# Patient Record
Sex: MALE | Race: WHITE
[De-identification: names, ages, dates, MRNs, and addresses within clinical notes are randomized per-mention and may not be internally consistent; named-entity substitution may affect disease eponyms.]

---

## 2022-01-27 ENCOUNTER — HOSPITAL ENCOUNTER (OUTPATIENT)
Dept: HOSPITAL 79 - EXRD | Age: 57
End: 2022-01-27
Attending: INTERNAL MEDICINE
Payer: COMMERCIAL

## 2022-01-27 DIAGNOSIS — I25.10: ICD-10-CM

## 2022-01-27 DIAGNOSIS — Z13.21: ICD-10-CM

## 2022-01-27 DIAGNOSIS — I10: Primary | ICD-10-CM

## 2022-01-27 DIAGNOSIS — E78.1: ICD-10-CM

## 2022-01-27 DIAGNOSIS — R06.2: ICD-10-CM

## 2022-01-27 DIAGNOSIS — Z00.00: ICD-10-CM

## 2022-01-27 DIAGNOSIS — E78.5: ICD-10-CM

## 2022-01-27 DIAGNOSIS — M10.9: ICD-10-CM

## 2022-03-02 ENCOUNTER — HOSPITAL ENCOUNTER (OUTPATIENT)
Dept: HOSPITAL 79 - HEART 5 | Age: 57
End: 2022-03-02
Attending: STUDENT IN AN ORGANIZED HEALTH CARE EDUCATION/TRAINING PROGRAM
Payer: COMMERCIAL

## 2022-03-02 DIAGNOSIS — I25.10: Primary | ICD-10-CM

## 2022-03-02 DIAGNOSIS — R06.2: ICD-10-CM

## 2022-03-02 DIAGNOSIS — R06.03: ICD-10-CM

## 2022-04-13 ENCOUNTER — HOSPITAL ENCOUNTER (OUTPATIENT)
Dept: HOSPITAL 79 - LAB | Age: 57
End: 2022-04-13
Attending: STUDENT IN AN ORGANIZED HEALTH CARE EDUCATION/TRAINING PROGRAM
Payer: COMMERCIAL

## 2022-04-13 DIAGNOSIS — E08.311: ICD-10-CM

## 2022-04-13 DIAGNOSIS — I10: Primary | ICD-10-CM

## 2022-04-13 DIAGNOSIS — E78.1: ICD-10-CM

## 2022-04-13 LAB — BUN/CREATININE RATIO: 13 (ref 0–10)

## 2022-05-26 ENCOUNTER — TELEPHONE (OUTPATIENT)
Dept: UROLOGY | Facility: CLINIC | Age: 57
End: 2022-05-26

## 2022-05-26 NOTE — TELEPHONE ENCOUNTER
Caller: SAULO FUNES    Relationship to patient: SELF    Best call back number: [phone]    Chief complaint: Hypogonadism    Type of visit: NEW PATIENT    If rescheduling, when is the original appointment: 5/26/22     Additional notes:PT STATES FLAT TIRE AND RAINING SO HE IS HAVING A HARD TIME GETTING IT FIXED. RESCHEDULED TO NEXT AVAILABLE 6/13/22.

## 2022-06-13 ENCOUNTER — OFFICE VISIT (OUTPATIENT)
Dept: UROLOGY | Facility: CLINIC | Age: 57
End: 2022-06-13

## 2022-06-13 VITALS — HEIGHT: 74 IN | WEIGHT: 273 LBS | BODY MASS INDEX: 35.04 KG/M2

## 2022-06-13 DIAGNOSIS — R79.89 LOW TESTOSTERONE IN MALE: ICD-10-CM

## 2022-06-13 DIAGNOSIS — N42.9 DISORDER OF PROSTATE: Primary | ICD-10-CM

## 2022-06-13 PROCEDURE — 36415 COLL VENOUS BLD VENIPUNCTURE: CPT | Performed by: UROLOGY

## 2022-06-13 PROCEDURE — 99203 OFFICE O/P NEW LOW 30 MIN: CPT | Performed by: UROLOGY

## 2022-06-13 RX ORDER — ERGOCALCIFEROL 1.25 MG/1
50000 CAPSULE ORAL
COMMUNITY
Start: 2022-06-05

## 2022-06-13 RX ORDER — TESTOSTERONE CYPIONATE 200 MG/ML
INJECTION, SOLUTION INTRAMUSCULAR
Qty: 10 ML | Refills: 2 | Status: SHIPPED | OUTPATIENT
Start: 2022-06-13

## 2022-06-13 RX ORDER — ATORVASTATIN CALCIUM 80 MG/1
80 TABLET, FILM COATED ORAL
COMMUNITY
Start: 2022-05-15

## 2022-06-13 RX ORDER — AMLODIPINE BESYLATE 10 MG/1
10 TABLET ORAL DAILY
COMMUNITY
Start: 2022-05-15

## 2022-06-13 RX ORDER — DULAGLUTIDE 0.75 MG/.5ML
0.75 INJECTION, SOLUTION SUBCUTANEOUS
COMMUNITY
Start: 2022-05-25

## 2022-06-13 RX ORDER — CLOPIDOGREL BISULFATE 75 MG/1
75 TABLET ORAL DAILY
COMMUNITY
Start: 2022-04-07

## 2022-06-13 RX ORDER — METOPROLOL SUCCINATE 25 MG/1
25 TABLET, EXTENDED RELEASE ORAL DAILY
COMMUNITY
Start: 2022-04-07

## 2022-06-13 NOTE — PROGRESS NOTES
Chief Complaint:          Chief Complaint   Patient presents with   • Low Testosterone      New patient        HPI:   56 y.o. male referred for evaluation of low testosterone.  Previously seen Dr. Lacy.  Previously used oral testosterone capsules twice a day in the early 2000's.  No family history of prostate cancer.  He has had a vasectomy.  He has erectile dysfunction his A1c was up to 14.  We will initiate therapy and check appropriate labs as examination was entirely on remarkable.  Low Testosterone: This pleasant male patient presents today with signs and symptoms that are consistent with low testosterone. He has positive Asif questionnaire by history, this includes both the sexual and nonsexual side effects.  Sexual side effects include inability to achieve and maintain an erection, inability to maintain his erection, and decreased interest in sexual activity.  Nonsexual symptomatology includes fatigue, difficulty completing a job, and tiredness.  We had a discussion of the various forms of testosterone available including parenteral, topical, and the form of a patch.  We discussed the efficacy of the gels and the injections, as well as the cost and benefits analysis.  We discussed the studies and talked about heart disease and its effect on prostate cancer, both of which are negligible.  He gave verbal consent to proceed with treatment.  He understands the risks and benefits of length.  He also completed his attempts at fertility. He understands the partial effect on spermatogenesis.      Past Medical History:      History reviewed. No pertinent past medical history.      Current Meds:     Current Outpatient Medications   Medication Sig Dispense Refill   • amLODIPine (NORVASC) 10 MG tablet Take 10 mg by mouth Daily. for blood pressure.     • atorvastatin (LIPITOR) 80 MG tablet Take 80 mg by mouth every night at bedtime.     • clopidogrel (PLAVIX) 75 MG tablet Take 75 mg by mouth Daily.     • metFORMIN  (GLUCOPHAGE) 500 MG tablet Take 500 mg by mouth Every 12 (Twelve) Hours. with food     • metoprolol succinate XL (TOPROL-XL) 25 MG 24 hr tablet Take 25 mg by mouth Daily.     • Trulicity 0.75 MG/0.5ML solution pen-injector 0.75 mg.     • vitamin D (ERGOCALCIFEROL) 1.25 MG (24525 UT) capsule capsule Take 50,000 Units by mouth Every 7 (Seven) Days.         No current facility-administered medications for this visit.        Allergies:      No Known Allergies     Past Surgical History:     History reviewed. No pertinent surgical history.      Social History:     Social History     Socioeconomic History   • Marital status:        Family History:     History reviewed. No pertinent family history.    Review of Systems:     Review of Systems   Constitutional: Negative.    HENT: Negative.    Eyes: Negative.    Respiratory: Negative.    Cardiovascular: Negative.    Gastrointestinal: Negative.    Endocrine: Negative.    Musculoskeletal: Negative.    Allergic/Immunologic: Negative.    Neurological: Negative.    Hematological: Negative.    Psychiatric/Behavioral: Negative.        Physical Exam:     Physical Exam  Vitals and nursing note reviewed.   Constitutional:       Appearance: He is well-developed.   HENT:      Head: Normocephalic and atraumatic.   Eyes:      Conjunctiva/sclera: Conjunctivae normal.      Pupils: Pupils are equal, round, and reactive to light.   Cardiovascular:      Rate and Rhythm: Normal rate and regular rhythm.      Heart sounds: Normal heart sounds.   Pulmonary:      Effort: Pulmonary effort is normal.      Breath sounds: Normal breath sounds.   Abdominal:      General: Bowel sounds are normal.      Palpations: Abdomen is soft.   Genitourinary:     Penis: Normal.       Testes: Normal.   Musculoskeletal:         General: Normal range of motion.      Cervical back: Normal range of motion.   Skin:     General: Skin is warm and dry.   Neurological:      Mental Status: He is alert and oriented to  person, place, and time.      Deep Tendon Reflexes: Reflexes are normal and symmetric.   Psychiatric:         Behavior: Behavior normal.         Thought Content: Thought content normal.         Judgment: Judgment normal.         I have reviewed the following portions of the patient's history: Allergies, current medications, past family history, past medical history, past social history, past surgical history, problem list, and ROS and confirm it is accurate.      Procedure:       Assessment/Plan:   Low Testosterone: This pleasant male patient presents today with signs and symptoms that are consistent with low testosterone. He has positive Asif questionnaire by history, this includes both the sexual and nonsexual side effects.  Sexual side effects include inability to achieve and maintain an erection, inability to maintain his erection, and decreased interest in sexual activity.  Nonsexual symptomatology includes fatigue, difficulty completing a job, and tiredness.  We had a discussion of the various forms of testosterone available including parenteral, topical, and the form of a patch.  We discussed the efficacy of the gels and the injections, as well as the cost and benefits analysis.  We discussed the studies and talked about heart disease and its effect on prostate cancer, both of which are negligible.  He gave verbal consent to proceed with treatment.  He understands the risks and benefits of length.  He also completed his attempts at fertility. He understands the partial effect on spermatogenesis.  Hyperestrogenism-we spoke about the role of estrogen metabolism and breakdown in the  presence of testosterone replacement therapy.  We spoke about how high estradiol levels can interfere with the improvement noted in a man on testosterone as well as significant side effects such as pseudogynecomastia.  We discussed the use of the medication Arimidex using a very judicious low-dose fashion to prevent too low of an  estradiol which would precipitate bone complications.    PSA testing-I am recommending a PSA blood test that stands for prostate specific antigen.  I discussed the pathophysiology of PSA testing indicating its use in the diagnosis and management of prostate cancer.  I discussed the normal range being 0 to 4, but more appropriately being much closer to 0 to 2 in a normal male.  I discussed the fact that after a certain age we don't recommend PSA testing especially in view of numerous comorbidities, that this will not be a useful test.  I discussed many of the things that can artificially raise PSA including a recent infection, urinary tract infection, and recent sexual intercourse, or even the type of movement such as manipulation of the prostate from riding a bicycle.  After all this is taken into account when the test is reviewed, the most important use of PSA is the velocity measurement.  In other words, the change of PSA with time is a very important factor in the use and that we look for greater than 20% rise over a year to help us make the prediction of prostate cancer.  I also discussed that the use with prostate cancer indicating that after a radical prostatectomy, the PSA should be 0 and any rise indicates an early biochemical recurrence.    Erectile dysfunction-we discussed the anatomy and physiology of the penis and the endothelium.  We discussed the various forms of erectile dysfunction including peripheral vascular occlusive disease, postoperative, secondary to radiation treatments of the prostate, and arterial inflow.  We discussed the various treatment options available including oral medication and its various forms.  We discussed the use of both generic and non-generic Viagra.  We discussed Cialis and a longer half-life of 17 hours as well as the other 2 medications.  We discussed cost involved with this including the fact that the generic is much cheaper but is taken as multiple pills because they  are 20 mg dosages.  We did discuss the other alternatives including penile injections, vacuum erection devices and surgical intervention reserved for only the most severe cases.  We discussed the need for testosterone in about 20% of cases of erectile dysfunction.           This document has been electronically signed by BUDDY SKINNER MD June 13, 2022 15:00 EDT

## 2022-06-14 LAB
ERYTHROCYTE [DISTWIDTH] IN BLOOD BY AUTOMATED COUNT: 13 % (ref 12.3–15.4)
ESTRADIOL SERPL-MCNC: 23.7 PG/ML (ref 7.6–42.6)
HCT VFR BLD AUTO: 41.4 % (ref 37.5–51)
HGB BLD-MCNC: 13.6 G/DL (ref 13–17.7)
MCH RBC QN AUTO: 29.1 PG (ref 26.6–33)
MCHC RBC AUTO-ENTMCNC: 32.9 G/DL (ref 31.5–35.7)
MCV RBC AUTO: 88.5 FL (ref 79–97)
PLATELET # BLD AUTO: 248 10*3/MM3 (ref 140–450)
PSA SERPL-MCNC: 0.45 NG/ML (ref 0–4)
RBC # BLD AUTO: 4.68 10*6/MM3 (ref 4.14–5.8)
TESTOST SERPL-MCNC: 163 NG/DL (ref 264–916)
WBC # BLD AUTO: 6.53 10*3/MM3 (ref 3.4–10.8)

## 2022-06-29 PROBLEM — R79.89 LOW TESTOSTERONE IN MALE: Status: ACTIVE | Noted: 2022-06-29

## 2022-07-25 ENCOUNTER — TELEPHONE (OUTPATIENT)
Dept: UROLOGY | Facility: CLINIC | Age: 57
End: 2022-07-25

## 2022-07-27 ENCOUNTER — TELEPHONE (OUTPATIENT)
Dept: UROLOGY | Facility: CLINIC | Age: 57
End: 2022-07-27

## 2022-07-27 DIAGNOSIS — R79.89 LOW TESTOSTERONE IN MALE: Primary | ICD-10-CM

## 2022-07-27 RX ORDER — TADALAFIL 20 MG/1
20 TABLET ORAL AS NEEDED
Qty: 20 TABLET | Refills: 1 | Status: SHIPPED | OUTPATIENT
Start: 2022-07-27

## 2022-07-27 NOTE — TELEPHONE ENCOUNTER
I called the pt back and he said he was curious about the ED meds now. Zack offered at visit and now patient changed mind. Routing to Zack.

## 2022-08-08 ENCOUNTER — TELEPHONE (OUTPATIENT)
Dept: UROLOGY | Facility: CLINIC | Age: 57
End: 2022-08-08

## 2022-08-08 NOTE — TELEPHONE ENCOUNTER
Patient said insurance will not pay for his erectile dysfunction medication. He is asking if he can be prescribed anything else and he stated he is a cardiac patient.

## 2022-08-26 ENCOUNTER — HOSPITAL ENCOUNTER (OUTPATIENT)
Dept: HOSPITAL 79 - LAB | Age: 57
End: 2022-08-26
Attending: NURSE PRACTITIONER
Payer: COMMERCIAL

## 2022-08-26 DIAGNOSIS — E29.1: ICD-10-CM

## 2022-08-26 DIAGNOSIS — R35.1: ICD-10-CM

## 2022-08-26 DIAGNOSIS — Z12.5: Primary | ICD-10-CM

## 2022-08-26 DIAGNOSIS — N52.9: ICD-10-CM

## 2022-08-26 DIAGNOSIS — N40.0: ICD-10-CM

## 2022-08-27 LAB
% FREE PSA: 55 %
PROSTATE SPECIFIC AG, SERUM: 0.4 NG/ML (ref 0–4)
PSA, FREE: 0.22 NG/ML